# Patient Record
Sex: FEMALE | Race: WHITE | HISPANIC OR LATINO | URBAN - METROPOLITAN AREA
[De-identification: names, ages, dates, MRNs, and addresses within clinical notes are randomized per-mention and may not be internally consistent; named-entity substitution may affect disease eponyms.]

---

## 2022-03-09 ENCOUNTER — EMERGENCY (EMERGENCY)
Facility: HOSPITAL | Age: 37
LOS: 1 days | Discharge: HOME | End: 2022-03-09
Attending: EMERGENCY MEDICINE | Admitting: EMERGENCY MEDICINE
Payer: COMMERCIAL

## 2022-03-09 VITALS
WEIGHT: 130.07 LBS | HEIGHT: 65 IN | DIASTOLIC BLOOD PRESSURE: 77 MMHG | OXYGEN SATURATION: 100 % | HEART RATE: 74 BPM | SYSTOLIC BLOOD PRESSURE: 123 MMHG | RESPIRATION RATE: 16 BRPM | TEMPERATURE: 97 F

## 2022-03-09 PROCEDURE — 73060 X-RAY EXAM OF HUMERUS: CPT

## 2022-03-09 PROCEDURE — 71101 X-RAY EXAM UNILAT RIBS/CHEST: CPT

## 2022-03-09 PROCEDURE — 73030 X-RAY EXAM OF SHOULDER: CPT

## 2022-03-09 PROCEDURE — 73060 X-RAY EXAM OF HUMERUS: CPT | Mod: 26,RT

## 2022-03-09 PROCEDURE — 73130 X-RAY EXAM OF HAND: CPT

## 2022-03-09 PROCEDURE — 73110 X-RAY EXAM OF WRIST: CPT | Mod: 26,RT

## 2022-03-09 PROCEDURE — 73030 X-RAY EXAM OF SHOULDER: CPT | Mod: 26,RT

## 2022-03-09 PROCEDURE — 99284 EMERGENCY DEPT VISIT MOD MDM: CPT | Mod: 25

## 2022-03-09 PROCEDURE — 73110 X-RAY EXAM OF WRIST: CPT

## 2022-03-09 PROCEDURE — 71101 X-RAY EXAM UNILAT RIBS/CHEST: CPT | Mod: 26

## 2022-03-09 PROCEDURE — 73130 X-RAY EXAM OF HAND: CPT | Mod: 26,RT

## 2022-03-09 PROCEDURE — 99284 EMERGENCY DEPT VISIT MOD MDM: CPT

## 2022-03-09 RX ORDER — IBUPROFEN 200 MG
600 TABLET ORAL ONCE
Refills: 0 | Status: COMPLETED | OUTPATIENT
Start: 2022-03-09 | End: 2022-03-09

## 2022-03-09 RX ADMIN — Medication 600 MILLIGRAM(S): at 16:46

## 2022-03-09 RX ADMIN — Medication 600 MILLIGRAM(S): at 15:46
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
## 2022-03-09 NOTE — ED PROVIDER NOTE - PATIENT PORTAL LINK FT
You can access the FollowMyHealth Patient Portal offered by [facility] by registering at the following website: [email or web address]. By joining [employer]’s FollowMyHealth portal, you will also be able to view your health information using other applications (apps) compatible with our system.
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
## 2022-03-09 NOTE — ED PROVIDER NOTE - NSFOLLOWUPCLINICS_GEN_ALL_ED_FT
[facility]  Family Medicine  [address] [place] [id number]  Phone: [phone]  Fax:

## 2022-03-09 NOTE — ED PROVIDER NOTE - CPE EDP PSYCH NORM
Clinical update for [employer]. #:        Let me know if you need anything or have any questions,  Raghavendra Barton, RN  Care Coordinator  [facility]  [address], [place]  Office:  [phone]  Fax:  [phone]  [email or web address] normal...

## 2022-03-09 NOTE — ED PROVIDER NOTE - PHYSICAL EXAMINATION
spine- no bony tenderness, positive ROM    c- collar in place, and cleared   RUE- + distal radial pulse, + tenderness/ swelling over right 2nd finger, + tenderness to anterior shoulder, + tenderness to mid humerus noted   + tenderness noted to right ribs

## 2022-03-09 NOTE — ED PROVIDER NOTE - CARE PROVIDER_API CALL
Ganesh Bae)  Orthopaedic Sports Medicine; Orthopaedic Surgery  [address] [address]  Phone: [phone]  Fax: [phone]  Follow Up Time:
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
## 2022-03-09 NOTE — ED PROVIDER NOTE - NSDCPRINTRESULTS_ED_ALL_ED
Inpatient Physical Exam
Patient requests all Lab, Cardiology, and Radiology Results on their Discharge Instructions

## 2022-03-09 NOTE — ED PROVIDER NOTE - NSFOLLOWUPINSTRUCTIONS_ED_ALL_ED_FT
Rest, ice, elevate   Take motrin 600mg every 6 hours as needed for pain   Return to the ED if any worsening or persistent symptoms.       Esguince de hombro    LO QUE NECESITA SABER:    Se produce un esguince de hombro cuando se estira o se desgarra un ligamento del hombro. Los ligamentos son los tejidos resistentes que conectan los huesos. Los ligamentos permiten alzar, bajar y hacer girar el brazo.    Anatomía del hombro         INSTRUCCIONES SOBRE EL CARLOS HOSPITALARIA:    Regrese a la zahira de emergencias si:  •Siente dolor intenso en el hombro cuando lo mueve o se lo toca.      •Ashton piel se siente fría o húmeda al tacto.      •Tiene entumecimiento, sensación de hormigueo o sensación de cosquilleo en el hombro.      •La piel del hombro lesionado se pone de color pramod o pálida.      Llame a ashton médico si:  •Siente más dolor o se le hincha más el hombro, o comienza a tener dolor o hinchazón.      •Siente más rigidez o comienza a sentir rigidez cuando mueve el hombro lesionado.      •Tamar síntomas no mejoran dentro de 5 a 7 días.      •Usted tiene preguntas o inquietudes acerca de ashton condición o cuidado.      Medicamentos:Es posible que usted necesite alguno de los siguientes:   •Acetaminofénalivia el dolor y baja la fiebre. Está disponible sin receta médica. Pregunte la cantidad y la frecuencia con que debe tomarlos. Siga las indicaciones. Rosario las etiquetas de todos los demás medicamentos que esté usando para saber si también contienen acetaminofén, o pregunte a ashton médico o farmacéutico. El acetaminofén puede causar daño en el hígado cuando no se mely de forma correcta. No use más de 4 gramos (4000 miligramos) en total de acetaminofeno en un día.      •Los JUNE,oskar el ibuprofeno, ayudan a disminuir la inflamación, el dolor y la fiebre. Calista medicamento está disponible con o sin marco receta médica. Los JUNE pueden causar sangrado estomacal o problemas renales en ciertas personas. Si usted mely un medicamento anticoagulante, siempre pregúntele a ashton médico si los JUNE son seguros para usted. Siempre rosario la etiqueta de calista medicamento y siga las instrucciones.      •Puede administrarsepodrían administrarse. Pregunte al médico cómo debe osman calista medicamento de forma knapp. Algunos medicamentos recetados para el dolor contienen acetaminofén. No tome otros medicamentos que contengan acetaminofén sin consultarlo con ashton médico. Demasiado acetaminofeno puede causar daño al hígado. Los medicamentos recetados para el dolor podrían causar estreñimiento. Pregunte a ashton médico oskar prevenir o tratar estreñimiento.      •[place] tamar medicamentos oskar se le haya indicado.Consulte con ashton médico si usted evelyn que ashton medicamento no le está ayudando o si presenta efectos secundarios. Infórmele si es alérgico a cualquier medicamento. Mantenga marco lista actualizada de los medicamentos, las vitaminas y los productos herbales que mely. Incluya los siguientes datos de los medicamentos: cantidad, frecuencia y motivo de administración. Traiga con usted la lista o los envases de las píldoras a tamar citas de seguimiento. Lleve la lista de los medicamentos con usted en rajesh de marco emergencia.      Acuda a la consulta de control con ashton médico según las indicaciones:Anote tamar preguntas para que se acuerde de hacerlas hari tamar visitas.    Cuidados personales:  •El descansosu hombro para que pueda sanar. Evite  el hombro mientras que ashton lesión isidoro. [place] ayudará a disminuir el riesgo de que se albina incluso más daño en el hombro.      •Aplique hieloen el hombro de 20 a 30 minutos cada 2 horas o oskar se lo indiquen. Use marco compresa de hielo o ponga hielo triturado en marco bolsa de plástico. Cúbrala con marco toalla antes de aplicarla sobre el hombro. El hielo ayuda a evitar daño al tejido y a disminuir la inflamación y el dolor.      •Compresiónsu hombro según indicaciones. La compresión (presión catarina) musa apoyo y contribuye a bajar la inflamación y limitar el movimiento para que ashton hombro pueda sanar. Si ashton esguince es leve, puede que le den un cabestrillo para apoyar el brazo. Puede que necesite un soporte acolchado o un yeso para mantener el hombro en ashton lugar si el esguince es más grave.      Cómo usar un soporte, cabestrillo o férula:Es posible que deba usar un soporte, cabestrillo o férula para limitar el movimiento y proteger el hombro lesionado.    Cabestrillo para hombro     •Use ashton soporte, cabestrillo o férula según indicaciones.Es posible que necesite llevarlo todo el tiempo y solamente quitarlo para bañarse o para hacer tamar ejercicios. Pregúntele a ashton médico cuánto tiempo debe usarlo.      •Mantenga ashton piel limpia y seca.El acolchado debajo de la axila ayudará a absorber la transpiración y evitar que le salgan llagas en la piel.      •No encorve los hombros.[place] puede causar dolor. Mantenga los hombros relajados.      •Coloque el cabestrillo encima del brazo y la mano, de modo que también le cubra los nudillos.De calista modo el cabestrillo también le sostendrá la wendie y la mano. Coloque la wendie por encima de la altura del codo. Se le podría adormecer o empezar a dolerle la wendie si el cabestrillo es demasiado corto.      Fisioterapia:Un fisioterapeuta le puede enseñar ejercicios para ayudarle a mejorar el movimiento y la fuerza, y para disminuir el dolor.    Evite otra lesión:  •No albina ejercicio cuando esté cansado o con dolor. Entre en calor y estírese antes de hacer ejercicio.      •Use equipo de protección cuando practique deportes.      •Use zapatos que le calcen melisa y corra sobre superficies [employer] para no caerse.         © Copyright [employer] 2022           back to top                          © Copyright [employer] 2022
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
## 2023-04-06 NOTE — ED ADULT NURSE NOTE - NSIMPLEMENTINTERV_GEN_ALL_ED
Implemented All Universal Safety Interventions:  [place] to call system. Call bell, personal items and telephone within reach. Instruct patient to call for assistance. Room bathroom lighting operational. Non-slip footwear when patient is off stretcher. Physically safe environment: no spills, clutter or unnecessary equipment. Stretcher in lowest position, wheels locked, appropriate side rails in place. no
